# Patient Record
Sex: FEMALE | Race: WHITE | NOT HISPANIC OR LATINO
[De-identification: names, ages, dates, MRNs, and addresses within clinical notes are randomized per-mention and may not be internally consistent; named-entity substitution may affect disease eponyms.]

---

## 2019-05-10 PROBLEM — Z00.00 ENCOUNTER FOR PREVENTIVE HEALTH EXAMINATION: Status: ACTIVE | Noted: 2019-05-10

## 2019-06-21 ENCOUNTER — APPOINTMENT (OUTPATIENT)
Dept: ORTHOPEDIC SURGERY | Facility: CLINIC | Age: 79
End: 2019-06-21

## 2019-07-11 ENCOUNTER — EMERGENCY (EMERGENCY)
Facility: HOSPITAL | Age: 79
LOS: 0 days | Discharge: HOME | End: 2019-07-11
Attending: EMERGENCY MEDICINE | Admitting: EMERGENCY MEDICINE
Payer: MEDICARE

## 2019-07-11 VITALS
SYSTOLIC BLOOD PRESSURE: 146 MMHG | RESPIRATION RATE: 19 BRPM | TEMPERATURE: 98 F | OXYGEN SATURATION: 96 % | HEART RATE: 81 BPM | WEIGHT: 164.91 LBS | DIASTOLIC BLOOD PRESSURE: 74 MMHG

## 2019-07-11 DIAGNOSIS — R60.0 LOCALIZED EDEMA: ICD-10-CM

## 2019-07-11 DIAGNOSIS — Z96.659 PRESENCE OF UNSPECIFIED ARTIFICIAL KNEE JOINT: Chronic | ICD-10-CM
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
## 2019-07-11 DIAGNOSIS — M25.561 PAIN IN RIGHT KNEE: ICD-10-CM

## 2019-07-11 DIAGNOSIS — M79.669 PAIN IN UNSPECIFIED LOWER LEG: ICD-10-CM
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
## 2019-07-11 PROCEDURE — 99284 EMERGENCY DEPT VISIT MOD MDM: CPT

## 2019-07-11 PROCEDURE — 93970 EXTREMITY STUDY: CPT | Mod: 26

## 2019-07-11 NOTE — ED PROVIDER NOTE - CARE PROVIDER_API CALL
Joesph Zaragoza (MD)  Orthopaedic Surgery  [address] [place] [address]  Phone: [phone]  Fax: [phone]  Follow Up Time: 1-3 Days

## 2019-07-11 NOTE — ED PROVIDER NOTE - CLINICAL SUMMARY MEDICAL DECISION MAKING FREE TEXT BOX
knee pain as above, joint stable extensor mechanism intact NVI distally calf nontender duplex neg, will d/c to continue supportive care, ortho f/u. Patient counseled regarding conditions which should prompt return.

## 2019-07-11 NOTE — ED PROVIDER NOTE - OBJECTIVE STATEMENT
79 y.o female w/ hx of HTn presents to the ED for evaluation of right knee pain x 1 week.  After twisting injury developed right knee pain.  Since then intermittent, throbbing pain, mild, worse w/ ambulation, alleviated with rest, no radiation of pain.  No hip pain.  went to [id number] office had negative xrays and told to take motrin.  Since then states mild bilateral edema lower extremities. Read online and concerned for DVT.  Denies hemoptysis, cough, chest pain, N/V/D, abd pain, back pain.  No further complaints.

## 2019-07-11 NOTE — ED PROVIDER NOTE - NSFOLLOWUPINSTRUCTIONS_ED_ALL_ED_FT

## 2019-07-11 NOTE — ED PROVIDER NOTE - NS ED ROS FT
Constitutional: See HPI.  Eyes: No visual changes, eye pain or discharge.   ENMT: No hearing changes, pain, discharge or infections.   Cardiac: No SOB or edema. No chest pain with exertion.  Respiratory: No cough or respiratory distress.  GI: No nausea, vomiting, diarrhea or abdominal pain.  : No dysuria, frequency or burning. No Discharge  MS: + right knee pain. No myalgia, muscle weakness, or back pain.  Neuro: No headache or weakness.   Skin: No skin rash.  Except as documented in the HPI, all other systems are negative.

## 2019-07-11 NOTE — ED PROVIDER NOTE - PHYSICAL EXAMINATION
CONST: Well appearing in NAD  EYES: Sclera and conjunctiva clear.  CARD: Normal S1 S2; Normal rate and rhythm  RESP: Equal BS B/L, No wheezes, rhonchi or rales. No distress  GI: Soft, non-tender, non-distended.  MS: no TTP right knee, no joint laxity, Normal ROM in all extremities. 1+ edema of bilateral lower extremities, no calf pain, radial pulses 2+ bilaterally, pedal pulses 1+ bilaterally  SKIN: Warm, dry, no acute rashes. Good turgor  NEURO: A&Ox3, No focal deficits. Strength 5/5 with no sensory deficits. Steady gait CONST: Well appearing in NAD  EYES: Sclera and conjunctiva clear.  CARD: Normal S1 S2; Normal rate and rhythm  RESP: Equal BS B/L, No wheezes, rhonchi or rales. No distress  GI: Soft, non-tender, non-distended.  MS: no TTP right knee, no joint laxity, Normal ROM in all extremities. 1+ edema of bilateral lower extremities, no calf pain, radial pulses 2+ bilaterally, pedal pulses 1+ bilaterally, no TTP of hips w/ internal/external rotation  SKIN: Warm, dry, no acute rashes. Good turgor  NEURO: A&Ox3, No focal deficits. Strength 5/5 with no sensory deficits. Steady gait

## 2022-01-04 ENCOUNTER — RX ONLY (RX ONLY)
Age: 82
End: 2022-01-04

## 2022-01-04 RX ORDER — HYDROCHLOROTHIAZIDE 25 MG/1
TABLET ORAL
Qty: 90 | Refills: 0 | Status: ACTIVE | COMMUNITY

## 2022-01-10 ENCOUNTER — RX ONLY (RX ONLY)
Age: 82
End: 2022-01-10

## 2022-01-10 RX ORDER — LOSARTAN POTASSIUM 100 MG/1
TABLET, FILM COATED ORAL
Qty: 90 | Refills: 0 | Status: ACTIVE | COMMUNITY

## 2022-02-09 ENCOUNTER — RX ONLY (RX ONLY)
Age: 82
End: 2022-02-09

## 2022-02-09 RX ORDER — SIMVASTATIN 40 MG/1
TABLET, FILM COATED ORAL
Qty: 90 | Refills: 0 | Status: ACTIVE | COMMUNITY

## 2022-02-21 ENCOUNTER — DOCTOR'S OFFICE (OUTPATIENT)
Dept: URBAN - METROPOLITAN AREA CLINIC 160 | Facility: CLINIC | Age: 82
Setting detail: OPHTHALMOLOGY
End: 2022-02-21
Payer: MEDICARE

## 2022-02-21 PROCEDURE — 92004 COMPRE OPH EXAM NEW PT 1/>: CPT | Performed by: OPTOMETRIST

## 2022-02-21 PROCEDURE — 92250 FUNDUS PHOTOGRAPHY W/I&R: CPT | Performed by: OPTOMETRIST

## 2022-02-21 ASSESSMENT — REFRACTION_CURRENTRX
OS_AXIS: 006
OD_SPHERE: +6.75
OD_CYLINDER: +1.00
OD_AXIS: 007
OD_SPHERE: +9.75
OD_OVR_VA: 20/
OS_OVR_VA: 20/
OS_CYLINDER: +1.75
OD_AXIS: 006
OD_OVR_VA: 20/
OS_SPHERE: +9.25
OS_OVR_VA: 20/
OS_AXIS: 004
OS_CYLINDER: +1.50
OD_CYLINDER: +1.00
OS_SPHERE: +6.25

## 2022-02-21 ASSESSMENT — REFRACTION_MANIFEST
OS_SPHERE: +5.75
OS_ADD: +2.50
OS_AXIS: 005
OD_CYLINDER: +1.00
OS_VA2: 20/30
OS_VA1: 20/40-
OS_CYLINDER: +1.50
OD_AXIS: 015
OD_SPHERE: +6.50
OD_VA2: 20/40
OD_VA1: 20/40
OD_ADD: +2.50

## 2022-02-21 ASSESSMENT — TONOMETRY
OS_IOP_MMHG: 17
OD_IOP_MMHG: 17

## 2022-02-21 ASSESSMENT — AXIALLENGTH_DERIVED
OD_AL: 21.75
OS_AL: 21.8424
OS_AL: 21.8424
OD_AL: 21.8722

## 2022-02-21 ASSESSMENT — KERATOMETRY
OS_AXISANGLE_DEGREES: 090
OS_K1POWER_DIOPTERS: 41.75
OS_K2POWER_DIOPTERS: 41.75
OD_AXISANGLE_DEGREES: 000
OD_K1POWER_DIOPTERS: 40.50
OD_K2POWER_DIOPTERS: 41.75

## 2022-02-21 ASSESSMENT — SPHEQUIV_DERIVED
OS_SPHEQUIV: 6.5
OS_SPHEQUIV: 6.5
OD_SPHEQUIV: 7
OD_SPHEQUIV: 7.375

## 2022-02-21 ASSESSMENT — REFRACTION_AUTOREFRACTION
OS_AXIS: 008
OD_CYLINDER: +1.25
OS_SPHERE: +5.75
OD_SPHERE: +6.75
OS_CYLINDER: +1.50
OD_AXIS: 009

## 2022-02-21 ASSESSMENT — VISUAL ACUITY
OS_BCVA: 20/30-1
OD_BCVA: 20/40+2

## 2022-02-21 ASSESSMENT — CONFRONTATIONAL VISUAL FIELD TEST (CVF)
OS_FINDINGS: FULL
OD_FINDINGS: FULL

## 2022-02-21 ASSESSMENT — TEAR BREAK UP TIME (TBUT)
OD_TBUT: 7SEC
OS_TBUT: 7SEC

## 2022-02-28 ENCOUNTER — RX ONLY (RX ONLY)
Age: 82
End: 2022-02-28

## 2022-02-28 RX ORDER — ALENDRONATE SODIUM 70 MG/1
TABLET ORAL
Qty: 4 | Refills: 2 | Status: ACTIVE | COMMUNITY

## 2022-03-19 ENCOUNTER — DOCTOR'S OFFICE (OUTPATIENT)
Dept: URBAN - METROPOLITAN AREA CLINIC 157 | Facility: CLINIC | Age: 82
Setting detail: OPHTHALMOLOGY
End: 2022-03-19
Payer: MEDICARE

## 2022-03-19 PROCEDURE — 92020 GONIOSCOPY: CPT | Performed by: OPHTHALMOLOGY

## 2022-03-19 PROCEDURE — 92012 INTRM OPH EXAM EST PATIENT: CPT | Performed by: OPHTHALMOLOGY

## 2022-03-19 ASSESSMENT — KERATOMETRY
OD_K2POWER_DIOPTERS: 41.25
OS_K2POWER_DIOPTERS: 41.75
OD_K1POWER_DIOPTERS: 40.00
OS_AXISANGLE_DEGREES: 005
OS_K1POWER_DIOPTERS: 41.00
OD_AXISANGLE_DEGREES: 010

## 2022-03-19 ASSESSMENT — SPHEQUIV_DERIVED
OS_SPHEQUIV: 6.5
OD_SPHEQUIV: 7
OS_SPHEQUIV: 6.25
OD_SPHEQUIV: 7.5

## 2022-03-19 ASSESSMENT — CONFRONTATIONAL VISUAL FIELD TEST (CVF)
OS_FINDINGS: FULL
OD_FINDINGS: FULL

## 2022-03-19 ASSESSMENT — REFRACTION_CURRENTRX
OS_AXIS: 004
OD_AXIS: 007
OD_CYLINDER: +1.00
OD_OVR_VA: 20/
OS_AXIS: 006
OD_AXIS: 006
OS_OVR_VA: 20/
OS_SPHERE: +9.25
OD_SPHERE: +6.75
OS_CYLINDER: +1.50
OD_OVR_VA: 20/
OD_SPHERE: +9.75
OS_OVR_VA: 20/
OS_CYLINDER: +1.75
OD_CYLINDER: +1.00
OS_SPHERE: +6.25

## 2022-03-19 ASSESSMENT — REFRACTION_MANIFEST
OS_VA2: 20/30
OD_VA2: 20/40
OD_VA1: 20/40
OD_ADD: +2.50
OS_CYLINDER: +1.50
OS_ADD: +2.50
OS_AXIS: 005
OS_SPHERE: +5.75
OD_SPHERE: +6.50
OS_VA1: 20/40-
OD_CYLINDER: +1.00
OD_AXIS: 015

## 2022-03-19 ASSESSMENT — AXIALLENGTH_DERIVED
OS_AL: 22.0458
OD_AL: 21.86
OD_AL: 22.03
OS_AL: 21.9611

## 2022-03-19 ASSESSMENT — VISUAL ACUITY
OS_BCVA: 20/30-1
OD_BCVA: 20/40+2

## 2022-03-19 ASSESSMENT — TONOMETRY
OS_IOP_MMHG: 11
OD_IOP_MMHG: 10
OS_IOP_MMHG: 10
OD_IOP_MMHG: 10

## 2022-03-19 ASSESSMENT — TEAR BREAK UP TIME (TBUT)
OS_TBUT: 7SEC
OD_TBUT: 7SEC

## 2022-03-19 ASSESSMENT — REFRACTION_AUTOREFRACTION
OD_CYLINDER: +1.50
OS_AXIS: 007
OD_AXIS: 018
OD_SPHERE: +6.75
OS_SPHERE: +5.25
OS_CYLINDER: +2.00

## 2022-04-19 ENCOUNTER — DOCTOR'S OFFICE (OUTPATIENT)
Dept: URBAN - METROPOLITAN AREA CLINIC 157 | Facility: CLINIC | Age: 82
Setting detail: OPHTHALMOLOGY
End: 2022-04-19
Payer: MEDICARE

## 2022-04-19 PROCEDURE — 92136 OPHTHALMIC BIOMETRY: CPT | Performed by: OPHTHALMOLOGY

## 2022-04-26 ENCOUNTER — AMBUL SURGICAL CARE (OUTPATIENT)
Dept: URBAN - METROPOLITAN AREA CLINIC 158 | Facility: CLINIC | Age: 82
Setting detail: OPHTHALMOLOGY
End: 2022-04-26
Payer: MEDICARE

## 2022-04-26 PROCEDURE — 66984 XCAPSL CTRC RMVL W/O ECP: CPT | Performed by: OPHTHALMOLOGY

## 2022-04-26 PROCEDURE — FEMTO/TORI FEMTO/TORIC: Performed by: OPHTHALMOLOGY

## 2022-04-27 ENCOUNTER — DOCTOR'S OFFICE (OUTPATIENT)
Dept: URBAN - METROPOLITAN AREA CLINIC 160 | Facility: CLINIC | Age: 82
Setting detail: OPHTHALMOLOGY
End: 2022-04-27
Payer: MEDICARE

## 2022-04-27 PROCEDURE — 99024 POSTOP FOLLOW-UP VISIT: CPT | Performed by: OPTOMETRIST

## 2022-04-27 ASSESSMENT — REFRACTION_CURRENTRX
OS_CYLINDER: +1.75
OS_SPHERE: +6.25
OD_SPHERE: +6.75
OD_AXIS: 006
OD_SPHERE: +9.75
OS_OVR_VA: 20/
OD_OVR_VA: 20/
OD_CYLINDER: +1.00
OS_CYLINDER: +1.50
OS_AXIS: 004
OS_OVR_VA: 20/
OD_OVR_VA: 20/
OS_AXIS: 006
OD_AXIS: 007
OS_SPHERE: +9.25
OD_CYLINDER: +1.00

## 2022-04-27 ASSESSMENT — REFRACTION_MANIFEST
OS_VA2: 20/30
OD_AXIS: 015
OD_VA2: 20/40
OS_VA1: 20/40-
OD_VA1: 20/40
OD_SPHERE: +6.50
OS_ADD: +2.50
OD_ADD: +2.50
OS_CYLINDER: +1.50
OD_CYLINDER: +1.00
OS_SPHERE: +5.75
OS_AXIS: 005

## 2022-04-27 ASSESSMENT — SPHEQUIV_DERIVED
OS_SPHEQUIV: 0.75
OS_SPHEQUIV: 6.5
OD_SPHEQUIV: 7.25
OD_SPHEQUIV: 7

## 2022-04-27 ASSESSMENT — KERATOMETRY
OD_K1POWER_DIOPTERS: 40.50
OD_K2POWER_DIOPTERS: 41.50
OD_AXISANGLE_DEGREES: 014
OS_K2POWER_DIOPTERS: 41.75
OS_K1POWER_DIOPTERS: 41.00
OS_AXISANGLE_DEGREES: 005

## 2022-04-27 ASSESSMENT — REFRACTION_AUTOREFRACTION
OD_SPHERE: +6.50
OD_CYLINDER: +1.50
OS_AXIS: 106
OS_SPHERE: +0.50
OS_CYLINDER: +0.50
OD_AXIS: 010

## 2022-04-27 ASSESSMENT — CONFRONTATIONAL VISUAL FIELD TEST (CVF)
OS_FINDINGS: FULL
OD_FINDINGS: FULL

## 2022-04-27 ASSESSMENT — AXIALLENGTH_DERIVED
OD_AL: 21.91
OS_AL: 21.9611
OD_AL: 21.83
OS_AL: 24.0906

## 2022-04-27 ASSESSMENT — TONOMETRY
OD_IOP_MMHG: 16
OS_IOP_MMHG: 13

## 2022-04-27 ASSESSMENT — VISUAL ACUITY
OD_BCVA: 20/70-1
OS_BCVA: 20/30-1

## 2022-04-27 ASSESSMENT — TEAR BREAK UP TIME (TBUT)
OD_TBUT: 7SEC
OS_TBUT: 7SEC

## 2022-05-03 ENCOUNTER — DOCTOR'S OFFICE (OUTPATIENT)
Dept: URBAN - METROPOLITAN AREA CLINIC 160 | Facility: CLINIC | Age: 82
Setting detail: OPHTHALMOLOGY
End: 2022-05-03
Payer: MEDICARE

## 2022-05-03 PROCEDURE — 92136 OPHTHALMIC BIOMETRY: CPT | Performed by: OPHTHALMOLOGY

## 2022-05-03 PROCEDURE — 99024 POSTOP FOLLOW-UP VISIT: CPT | Performed by: OPTOMETRIST

## 2022-05-03 ASSESSMENT — REFRACTION_MANIFEST
OD_VA1: 20/40
OD_SPHERE: +6.50
OS_VA2: 20/30
OD_SPHERE: +6.25
OD_ADD: +2.50
OD_VA1: 20/40
OD_CYLINDER: +1.00
OS_VA1: 20/40-
OS_SPHERE: +0.25
OD_AXIS: 015
OS_VA1: 20/30-2
OS_SPHERE: +5.75
OS_AXIS: 135
OS_CYLINDER: +0.75
OD_VA2: 20/40
OS_CYLINDER: +1.50
OD_CYLINDER: +1.00
OD_AXIS: 015
OS_AXIS: 005
OS_ADD: +2.50

## 2022-05-03 ASSESSMENT — SPHEQUIV_DERIVED
OS_SPHEQUIV: 0.125
OD_SPHEQUIV: 7.125
OS_SPHEQUIV: 6.5
OS_SPHEQUIV: 0.625
OD_SPHEQUIV: 7
OD_SPHEQUIV: 6.75

## 2022-05-03 ASSESSMENT — REFRACTION_CURRENTRX
OD_AXIS: 007
OD_SPHERE: +9.75
OS_CYLINDER: +1.75
OD_OVR_VA: 20/
OS_AXIS: 006
OS_SPHERE: +9.25
OD_AXIS: 006
OS_OVR_VA: 20/
OS_CYLINDER: +1.50
OD_CYLINDER: +1.00
OD_OVR_VA: 20/
OS_AXIS: 004
OD_SPHERE: +6.75
OD_CYLINDER: +1.00
OS_SPHERE: +6.25
OS_OVR_VA: 20/

## 2022-05-03 ASSESSMENT — KERATOMETRY
OD_AXISANGLE_DEGREES: 017
OS_K1POWER_DIOPTERS: 41.50
OD_K2POWER_DIOPTERS: 41.50
OD_K1POWER_DIOPTERS: 40.50
OS_AXISANGLE_DEGREES: 174
OS_K2POWER_DIOPTERS: 41.75

## 2022-05-03 ASSESSMENT — TEAR BREAK UP TIME (TBUT)
OS_TBUT: 6SEC
OD_TBUT: 6SEC

## 2022-05-03 ASSESSMENT — VISUAL ACUITY
OS_BCVA: 20/30-1
OD_BCVA: 20/40

## 2022-05-03 ASSESSMENT — LID EXAM ASSESSMENTS
OS_BLEPHARITIS: LLL T
OD_BLEPHARITIS: RLL T

## 2022-05-03 ASSESSMENT — REFRACTION_AUTOREFRACTION
OS_CYLINDER: +0.75
OS_SPHERE: -0.25
OD_AXIS: 014
OS_AXIS: 114
OD_SPHERE: +6.50
OD_CYLINDER: +1.25

## 2022-05-03 ASSESSMENT — AXIALLENGTH_DERIVED
OD_AL: 21.91
OS_AL: 21.8818
OD_AL: 22
OS_AL: 24.2498
OS_AL: 24.0457
OD_AL: 21.87

## 2022-05-03 ASSESSMENT — TONOMETRY
OD_IOP_MMHG: 16
OS_IOP_MMHG: 15

## 2022-05-10 ENCOUNTER — AMBUL SURGICAL CARE (OUTPATIENT)
Dept: URBAN - METROPOLITAN AREA CLINIC 158 | Facility: CLINIC | Age: 82
Setting detail: OPHTHALMOLOGY
End: 2022-05-10
Payer: MEDICARE

## 2022-05-10 PROCEDURE — 66984 XCAPSL CTRC RMVL W/O ECP: CPT | Performed by: OPHTHALMOLOGY

## 2022-05-10 PROCEDURE — FEMTO/TORI FEMTO/TORIC: Performed by: OPHTHALMOLOGY

## 2022-05-11 ENCOUNTER — DOCTOR'S OFFICE (OUTPATIENT)
Dept: URBAN - METROPOLITAN AREA CLINIC 160 | Facility: CLINIC | Age: 82
Setting detail: OPHTHALMOLOGY
End: 2022-05-11
Payer: MEDICARE

## 2022-05-11 PROCEDURE — 99024 POSTOP FOLLOW-UP VISIT: CPT | Performed by: OPTOMETRIST

## 2022-05-11 ASSESSMENT — REFRACTION_CURRENTRX
OS_OVR_VA: 20/
OD_CYLINDER: +1.00
OD_CYLINDER: +1.00
OD_OVR_VA: 20/
OD_SPHERE: +6.75
OS_AXIS: 006
OS_SPHERE: +9.25
OS_SPHERE: +6.25
OS_AXIS: 004
OS_CYLINDER: +1.75
OD_SPHERE: +9.75
OS_OVR_VA: 20/
OS_CYLINDER: +1.50
OD_OVR_VA: 20/
OD_AXIS: 006
OD_AXIS: 007

## 2022-05-11 ASSESSMENT — AXIALLENGTH_DERIVED
OD_AL: 21.87
OS_AL: 24.2498
OS_AL: 24.0457
OD_AL: 21.91
OD_AL: 22
OS_AL: 21.8818

## 2022-05-11 ASSESSMENT — SPHEQUIV_DERIVED
OD_SPHEQUIV: 7.125
OD_SPHEQUIV: 7
OS_SPHEQUIV: 6.5
OS_SPHEQUIV: 0.625
OS_SPHEQUIV: 0.125
OD_SPHEQUIV: 6.75

## 2022-05-11 ASSESSMENT — REFRACTION_MANIFEST
OD_VA2: 20/40
OD_SPHERE: +6.50
OD_ADD: +2.50
OS_VA1: 20/40-
OS_VA1: 20/30-2
OD_CYLINDER: +1.00
OD_VA1: 20/40
OD_CYLINDER: +1.00
OS_SPHERE: +5.75
OS_AXIS: 135
OD_AXIS: 015
OS_SPHERE: +0.25
OS_CYLINDER: +0.75
OS_AXIS: 005
OS_CYLINDER: +1.50
OD_SPHERE: +6.25
OS_ADD: +2.50
OD_VA1: 20/40
OD_AXIS: 015
OS_VA2: 20/30

## 2022-05-11 ASSESSMENT — CONFRONTATIONAL VISUAL FIELD TEST (CVF)
OS_FINDINGS: FULL
OD_FINDINGS: FULL

## 2022-05-11 ASSESSMENT — REFRACTION_AUTOREFRACTION
OD_AXIS: 014
OD_SPHERE: +6.50
OS_CYLINDER: +0.75
OD_CYLINDER: +1.25
OS_AXIS: 114
OS_SPHERE: -0.25

## 2022-05-11 ASSESSMENT — VISUAL ACUITY
OD_BCVA: 20/50+1
OS_BCVA: 20/50-2

## 2022-05-11 ASSESSMENT — TEAR BREAK UP TIME (TBUT)
OD_TBUT: 6SEC
OS_TBUT: 6SEC

## 2022-05-11 ASSESSMENT — TONOMETRY
OD_IOP_MMHG: 10
OS_IOP_MMHG: 16
OD_IOP_MMHG: 13

## 2022-05-11 ASSESSMENT — KERATOMETRY
OS_K1POWER_DIOPTERS: 41.50
OD_K2POWER_DIOPTERS: 41.50
OD_K1POWER_DIOPTERS: 40.50
OS_AXISANGLE_DEGREES: 174
OD_AXISANGLE_DEGREES: 017
OS_K2POWER_DIOPTERS: 41.75

## 2022-05-11 ASSESSMENT — LID EXAM ASSESSMENTS
OS_BLEPHARITIS: LLL T
OD_BLEPHARITIS: RLL T

## 2022-06-24 ENCOUNTER — DOCTOR'S OFFICE (OUTPATIENT)
Dept: URBAN - METROPOLITAN AREA CLINIC 159 | Facility: CLINIC | Age: 82
Setting detail: OPHTHALMOLOGY
End: 2022-06-24
Payer: MEDICARE

## 2022-06-24 PROBLEM — H01.005 BLEPHARITIS; RIGHT LOWER LID, LEFT LOWER LID: Status: ACTIVE | Noted: 2022-05-03

## 2022-06-24 PROBLEM — H43.393 VITREOUS OPACITIES, OTHER; BOTH EYES: Status: ACTIVE | Noted: 2022-02-21

## 2022-06-24 PROBLEM — H25.13 CATARACT NUCLEAR SCLEROSIS AGE RELATED; BOTH EYES: Status: RESOLVED | Noted: 2022-02-21 | Resolved: 2022-06-24

## 2022-06-24 PROBLEM — H04.122 DRY EYE SYNDROME LACRIMAL GLAND;  , LEFT EYE: Status: ACTIVE | Noted: 2022-06-24

## 2022-06-24 PROBLEM — H35.3132 AGE RELATED MACULAR DEGENERATION DRY; BOTH EYES INTERMEDIATE: Status: ACTIVE | Noted: 2022-02-21

## 2022-06-24 PROBLEM — H40.033 ANATOMICAL NARROW ANGLE; BOTH EYES: Status: RESOLVED | Noted: 2022-03-19 | Resolved: 2022-06-24

## 2022-06-24 PROBLEM — H01.002 BLEPHARITIS; RIGHT LOWER LID, LEFT LOWER LID: Status: ACTIVE | Noted: 2022-05-03

## 2022-06-24 PROBLEM — Z96.1 PRESENCE OF INTRAOCULAR LENS ; LEFT EYE  BOTH EYES: Status: ACTIVE | Noted: 2022-04-27

## 2022-06-24 PROBLEM — H52.4 PRESBYOPIA: Status: ACTIVE | Noted: 2022-06-24

## 2022-06-24 PROCEDURE — 99024 POSTOP FOLLOW-UP VISIT: CPT | Performed by: OPTOMETRIST

## 2022-06-24 PROCEDURE — 92015 DETERMINE REFRACTIVE STATE: CPT | Performed by: OPTOMETRIST

## 2022-06-24 ASSESSMENT — TONOMETRY
OD_IOP_MMHG: 10
OS_IOP_MMHG: 10

## 2022-06-24 ASSESSMENT — REFRACTION_MANIFEST
OD_VA2: 20/20
OD_VA1: 20/40
OD_CYLINDER: +0.50
OD_SPHERE: +6.50
OS_CYLINDER: SPH
OD_VA1: 20/30-2
OS_AXIS: 005
OD_SPHERE: +1.75
OS_ADD: +1.50
OD_CYLINDER: SPH
OS_ADD: +2.50
OS_VA1: 20/30+2
OD_VA2: 20/40
OS_VA2: 20/30
OS_SPHERE: +0.50
OD_ADD: +2.50
OD_AXIS: 015
OD_ADD: +1.50
OD_CYLINDER: +1.00
OS_VA2: 20/20
OD_AXIS: 055
OS_CYLINDER: SPH
OD_SPHERE: +0.25
OS_SPHERE: +2.00
OS_VA1: 20/40-
OS_SPHERE: +5.75
OS_CYLINDER: +1.50

## 2022-06-24 ASSESSMENT — REFRACTION_CURRENTRX
OS_AXIS: 004
OD_AXIS: 006
OS_SPHERE: +6.25
OD_OVR_VA: 20/
OS_CYLINDER: +1.50
OS_CYLINDER: +1.75
OD_SPHERE: +9.75
OD_CYLINDER: +1.00
OD_CYLINDER: +1.00
OS_AXIS: 006
OD_OVR_VA: 20/
OD_AXIS: 007
OS_OVR_VA: 20/
OS_SPHERE: +9.25
OS_OVR_VA: 20/
OD_SPHERE: +6.75

## 2022-06-24 ASSESSMENT — KERATOMETRY
OS_K2POWER_DIOPTERS: 42.00
OS_AXISANGLE_DEGREES: 061
OD_K1POWER_DIOPTERS: 40.50
OD_AXISANGLE_DEGREES: 024
OS_K1POWER_DIOPTERS: 41.75
OD_K2POWER_DIOPTERS: 41.25

## 2022-06-24 ASSESSMENT — CONFRONTATIONAL VISUAL FIELD TEST (CVF)
OS_FINDINGS: FULL
OD_FINDINGS: FULL

## 2022-06-24 ASSESSMENT — SPHEQUIV_DERIVED
OD_SPHEQUIV: 0.5
OD_SPHEQUIV: 0.5
OD_SPHEQUIV: 7
OS_SPHEQUIV: 6.5
OS_SPHEQUIV: 0.625

## 2022-06-24 ASSESSMENT — VISUAL ACUITY
OD_BCVA: 20/40+2
OS_BCVA: 20/30-2

## 2022-06-24 ASSESSMENT — AXIALLENGTH_DERIVED
OD_AL: 24.3872
OS_AL: 23.9507
OD_AL: 21.95
OS_AL: 21.8031
OD_AL: 24.3872

## 2022-06-24 ASSESSMENT — REFRACTION_AUTOREFRACTION
OD_AXIS: 050
OS_SPHERE: +0.50
OD_SPHERE: +0.25
OS_AXIS: 130
OD_CYLINDER: +0.50
OS_CYLINDER: +0.25

## 2022-06-24 ASSESSMENT — TEAR BREAK UP TIME (TBUT)
OD_TBUT: 6SEC
OS_TBUT: 6SEC

## 2022-06-24 ASSESSMENT — SUPERFICIAL PUNCTATE KERATITIS (SPK): OS_SPK: 1+

## 2022-12-28 ENCOUNTER — DOCTOR'S OFFICE (OUTPATIENT)
Dept: URBAN - METROPOLITAN AREA CLINIC 160 | Facility: CLINIC | Age: 82
Setting detail: OPHTHALMOLOGY
End: 2022-12-28
Payer: MEDICARE

## 2022-12-28 DIAGNOSIS — H43.393: ICD-10-CM

## 2022-12-28 DIAGNOSIS — H04.122: ICD-10-CM

## 2022-12-28 DIAGNOSIS — H35.3132: ICD-10-CM

## 2022-12-28 DIAGNOSIS — Z96.1: ICD-10-CM

## 2022-12-28 DIAGNOSIS — H52.4: ICD-10-CM

## 2022-12-28 PROCEDURE — 92014 COMPRE OPH EXAM EST PT 1/>: CPT | Performed by: OPTOMETRIST

## 2022-12-28 PROCEDURE — 92015 DETERMINE REFRACTIVE STATE: CPT | Performed by: OPTOMETRIST

## 2022-12-28 PROCEDURE — 92134 CPTRZ OPH DX IMG PST SGM RTA: CPT | Performed by: OPTOMETRIST

## 2022-12-28 ASSESSMENT — REFRACTION_MANIFEST
OD_CYLINDER: +1.00
OD_CYLINDER: +0.50
OS_AXIS: 070
OS_SPHERE: +5.75
OS_VA1: 20/30+2
OS_VA1: 20/30+
OD_VA2: 20/20
OS_CYLINDER: +1.50
OS_AXIS: 005
OD_SPHERE: +0.25
OS_CYLINDER: +0.75
OS_SPHERE: +0.50
OS_VA2: 20/30
OD_ADD: +2.50
OS_VA1: 20/40-
OS_ADD: +2.50
OD_VA2: 20/40
OD_AXIS: 030
OS_CYLINDER: SPH
OD_AXIS: 015
OD_VA1: 20/40
OS_SPHERE: +0.25
OD_VA1: 20/30-2
OS_ADD: +1.50
OD_SPHERE: +0.50
OS_VA2: 20/20
OD_ADD: +2.50
OD_CYLINDER: +1.00
OS_ADD: +2.50
OD_ADD: +1.50
OD_VA1: 20/20-1
OD_SPHERE: +6.50
OD_AXIS: 055

## 2022-12-28 ASSESSMENT — REFRACTION_CURRENTRX
OD_OVR_VA: 20/
OS_SPHERE: +9.25
OD_SPHERE: +6.75
OS_AXIS: 004
OD_CYLINDER: +1.00
OD_OVR_VA: 20/
OD_CYLINDER: +1.00
OS_SPHERE: +6.25
OS_CYLINDER: +1.75
OS_CYLINDER: +1.50
OS_AXIS: 006
OS_OVR_VA: 20/
OD_AXIS: 006
OS_OVR_VA: 20/
OD_SPHERE: +9.75
OD_AXIS: 007

## 2022-12-28 ASSESSMENT — VISUAL ACUITY
OS_BCVA: 20/40-2
OD_BCVA: 20/50

## 2022-12-28 ASSESSMENT — SPHEQUIV_DERIVED
OD_SPHEQUIV: 0.5
OD_SPHEQUIV: 7
OS_SPHEQUIV: 0.25
OD_SPHEQUIV: 0.75
OS_SPHEQUIV: 0.625
OD_SPHEQUIV: 1
OS_SPHEQUIV: 6.5

## 2022-12-28 ASSESSMENT — AXIALLENGTH_DERIVED
OS_AL: 21.8031
OS_AL: 24.1022
OD_AL: 24.1808
OD_AL: 24.2836
OD_AL: 24.3872
OS_AL: 23.9507
OD_AL: 21.95

## 2022-12-28 ASSESSMENT — KERATOMETRY
OD_K2POWER_DIOPTERS: 41.25
OD_AXISANGLE_DEGREES: 024
OS_K1POWER_DIOPTERS: 41.75
OS_AXISANGLE_DEGREES: 061
OS_K2POWER_DIOPTERS: 42.00
OD_K1POWER_DIOPTERS: 40.50

## 2022-12-28 ASSESSMENT — REFRACTION_AUTOREFRACTION
OD_SPHERE: +0.25
OD_CYLINDER: +1.00
OS_CYLINDER: +1.00
OS_AXIS: 097
OD_AXIS: 017
OS_SPHERE: -0.25

## 2022-12-28 ASSESSMENT — CONFRONTATIONAL VISUAL FIELD TEST (CVF)
OS_FINDINGS: FULL
OD_FINDINGS: FULL

## 2022-12-28 ASSESSMENT — TEAR BREAK UP TIME (TBUT)
OS_TBUT: 6SEC
OD_TBUT: 6SEC

## 2022-12-28 ASSESSMENT — TONOMETRY
OD_IOP_MMHG: 16
OS_IOP_MMHG: 14

## 2022-12-28 ASSESSMENT — SUPERFICIAL PUNCTATE KERATITIS (SPK): OS_SPK: 1+

## 2023-03-29 ENCOUNTER — DOCTOR'S OFFICE (OUTPATIENT)
Dept: URBAN - METROPOLITAN AREA CLINIC 160 | Facility: CLINIC | Age: 83
Setting detail: OPHTHALMOLOGY
End: 2023-03-29
Payer: MEDICARE

## 2023-03-29 DIAGNOSIS — H35.3132: ICD-10-CM

## 2023-03-29 DIAGNOSIS — H16.223: ICD-10-CM

## 2023-03-29 PROCEDURE — 99213 OFFICE O/P EST LOW 20 MIN: CPT | Performed by: OPTOMETRIST

## 2023-03-29 PROCEDURE — 92134 CPTRZ OPH DX IMG PST SGM RTA: CPT | Performed by: OPTOMETRIST

## 2023-03-29 ASSESSMENT — REFRACTION_CURRENTRX
OD_CYLINDER: +1.00
OD_OVR_VA: 20/
OS_SPHERE: +9.25
OD_OVR_VA: 20/
OS_SPHERE: +6.25
OD_SPHERE: +9.75
OD_CYLINDER: +1.00
OD_AXIS: 006
OD_AXIS: 007
OS_CYLINDER: +1.50
OS_CYLINDER: +1.75
OS_AXIS: 006
OS_OVR_VA: 20/
OS_OVR_VA: 20/
OD_SPHERE: +6.75
OS_AXIS: 004

## 2023-03-29 ASSESSMENT — SPHEQUIV_DERIVED
OD_SPHEQUIV: 0.5
OS_SPHEQUIV: 3.5
OD_SPHEQUIV: 7
OS_SPHEQUIV: 6.5
OD_SPHEQUIV: 3.75
OD_SPHEQUIV: 0.875
OS_SPHEQUIV: 0.625
OD_SPHEQUIV: 1

## 2023-03-29 ASSESSMENT — REFRACTION_AUTOREFRACTION
OD_SPHERE: +0.50
OD_CYLINDER: +0.75
OD_AXIS: 017
OS_CYLINDER: +0.75
OS_AXIS: 102
OS_SPHERE: PLANO

## 2023-03-29 ASSESSMENT — KERATOMETRY
OS_K1POWER_DIOPTERS: 41.25
OD_AXISANGLE_DEGREES: 021
OD_K2POWER_DIOPTERS: 41.25
OS_K2POWER_DIOPTERS: 41.75
OD_K1POWER_DIOPTERS: 40.25
OS_AXISANGLE_DEGREES: 011
METHOD_AUTO_MANUAL: AUTO

## 2023-03-29 ASSESSMENT — REFRACTION_MANIFEST
OD_VA1: 20/30-2
OS_SPHERE: +0.25
OS_CYLINDER: +0.50
OS_AXIS: 100
OD_SPHERE: +0.25
OS_AXIS: 005
OD_CYLINDER: +1.00
OS_VA2: 20/30
OS_CYLINDER: SPH
OS_VA1: 20/30+
OD_CYLINDER: +0.50
OS_SPHERE: +5.75
OS_VA2: 20/20
OD_AXIS: 015
OS_ADD: +2.50
OD_VA1: 20/20-1
OS_CYLINDER: +0.75
OD_AXIS: 030
OD_SPHERE: +3.25
OS_VA1: 20/40-
OD_AXIS: 015
OD_ADD: +1.50
OD_SPHERE: +0.50
OD_VA2: 20/30
OD_ADD: +2.50
OS_ADD: +1.50
OD_VA1: 20/40
OD_ADD: +2.50
OD_CYLINDER: +1.00
OD_SPHERE: +6.50
OD_VA2: 20/20
OS_AXIS: 070
OS_VA1: 20/30+2
OS_SPHERE: +0.50
OD_AXIS: 055
OD_VA2: 20/40
OS_SPHERE: +3.25
OD_CYLINDER: +1.00
OS_ADD: +2.50
OS_VA2: 20/30
OS_CYLINDER: +1.50

## 2023-03-29 ASSESSMENT — TONOMETRY
OS_IOP_MMHG: 15
OD_IOP_MMHG: 15

## 2023-03-29 ASSESSMENT — AXIALLENGTH_DERIVED
OD_AL: 24.4364
OD_AL: 23.1494
OS_AL: 22.9792
OD_AL: 24.2292
OS_AL: 24.0935
OD_AL: 24.2806
OS_AL: 21.9214
OD_AL: 21.99

## 2023-03-29 ASSESSMENT — TEAR BREAK UP TIME (TBUT)
OS_TBUT: INSTANT
OD_TBUT: INSTANT

## 2023-03-29 ASSESSMENT — CONFRONTATIONAL VISUAL FIELD TEST (CVF)
OD_FINDINGS: FULL
OS_FINDINGS: FULL

## 2023-03-29 ASSESSMENT — VISUAL ACUITY
OD_BCVA: 20/50-3
OS_BCVA: 20/80-1

## 2023-06-21 ENCOUNTER — DOCTOR'S OFFICE (OUTPATIENT)
Dept: URBAN - METROPOLITAN AREA CLINIC 160 | Facility: CLINIC | Age: 83
Setting detail: OPHTHALMOLOGY
End: 2023-06-21
Payer: MEDICARE

## 2023-06-21 DIAGNOSIS — H35.3132: ICD-10-CM

## 2023-06-21 DIAGNOSIS — Z96.1: ICD-10-CM

## 2023-06-21 DIAGNOSIS — H43.393: ICD-10-CM

## 2023-06-21 DIAGNOSIS — H16.223: ICD-10-CM

## 2023-06-21 PROCEDURE — 92014 COMPRE OPH EXAM EST PT 1/>: CPT | Performed by: OPTOMETRIST

## 2023-06-21 PROCEDURE — 92250 FUNDUS PHOTOGRAPHY W/I&R: CPT | Performed by: OPTOMETRIST

## 2023-06-21 ASSESSMENT — TEAR BREAK UP TIME (TBUT)
OS_TBUT: INSTANT
OD_TBUT: INSTANT

## 2023-06-21 ASSESSMENT — REFRACTION_MANIFEST
OS_AXIS: 100
OD_VA2: 20/20
OS_VA2: 20/30
OS_VA1: 20/30+2
OS_AXIS: 005
OS_AXIS: 070
OD_SPHERE: +0.50
OS_ADD: +2.50
OS_SPHERE: +0.50
OD_SPHERE: +0.25
OD_VA2: 20/30
OS_VA2: 20/20
OD_ADD: +1.50
OS_CYLINDER: +1.50
OS_VA1: 20/40-
OD_ADD: +2.50
OS_SPHERE: +5.75
OD_CYLINDER: +0.50
OS_VA1: 20/30+
OD_AXIS: 015
OD_CYLINDER: +1.00
OD_VA2: 20/40
OS_VA2: 20/30
OD_AXIS: 055
OD_CYLINDER: +1.00
OD_SPHERE: +6.50
OS_CYLINDER: +0.75
OD_CYLINDER: +1.00
OD_AXIS: 030
OD_SPHERE: +3.25
OS_ADD: +2.50
OD_VA1: 20/40
OD_VA1: 20/20-1
OS_CYLINDER: +0.50
OS_CYLINDER: SPH
OS_SPHERE: +3.25
OD_ADD: +2.50
OS_ADD: +1.50
OS_SPHERE: +0.25
OD_AXIS: 015
OD_VA1: 20/30-2

## 2023-06-21 ASSESSMENT — AXIALLENGTH_DERIVED
OD_AL: 24.2292
OD_AL: 24.2806
OS_AL: 24.1444
OS_AL: 24.0935
OD_AL: 21.99
OS_AL: 22.9792
OS_AL: 21.9214
OD_AL: 23.1494
OD_AL: 24.4364

## 2023-06-21 ASSESSMENT — REFRACTION_CURRENTRX
OD_CYLINDER: +1.00
OD_SPHERE: +6.75
OS_SPHERE: +6.25
OD_SPHERE: +9.75
OS_OVR_VA: 20/
OD_OVR_VA: 20/
OS_CYLINDER: +1.75
OD_AXIS: 007
OD_AXIS: 006
OD_CYLINDER: +1.00
OS_AXIS: 006
OS_AXIS: 004
OS_OVR_VA: 20/
OS_CYLINDER: +1.50
OS_SPHERE: +9.25
OD_OVR_VA: 20/

## 2023-06-21 ASSESSMENT — TONOMETRY
OD_IOP_MMHG: 17
OS_IOP_MMHG: 16

## 2023-06-21 ASSESSMENT — KERATOMETRY
OD_K2POWER_DIOPTERS: 41.25
METHOD_AUTO_MANUAL: AUTO
OD_AXISANGLE_DEGREES: 021
OD_K1POWER_DIOPTERS: 40.25
OS_K1POWER_DIOPTERS: 41.25
OS_AXISANGLE_DEGREES: 011
OS_K2POWER_DIOPTERS: 41.75

## 2023-06-21 ASSESSMENT — SPHEQUIV_DERIVED
OS_SPHEQUIV: 3.5
OS_SPHEQUIV: 0.5
OD_SPHEQUIV: 0.5
OS_SPHEQUIV: 6.5
OS_SPHEQUIV: 0.625
OD_SPHEQUIV: 1
OD_SPHEQUIV: 0.875
OD_SPHEQUIV: 7
OD_SPHEQUIV: 3.75

## 2023-06-21 ASSESSMENT — REFRACTION_AUTOREFRACTION
OS_CYLINDER: +0.50
OD_AXIS: 017
OS_AXIS: 113
OD_CYLINDER: +0.75
OD_SPHERE: +0.50
OS_SPHERE: +0.25

## 2023-06-21 ASSESSMENT — VISUAL ACUITY
OD_BCVA: 20/60+1
OS_BCVA: 20/80

## 2023-06-21 ASSESSMENT — CONFRONTATIONAL VISUAL FIELD TEST (CVF)
OD_FINDINGS: FULL
OS_FINDINGS: FULL